# Patient Record
Sex: MALE | Race: WHITE | NOT HISPANIC OR LATINO | Employment: OTHER | URBAN - METROPOLITAN AREA
[De-identification: names, ages, dates, MRNs, and addresses within clinical notes are randomized per-mention and may not be internally consistent; named-entity substitution may affect disease eponyms.]

---

## 2023-11-16 ENCOUNTER — LAB (OUTPATIENT)
Dept: LAB | Facility: LAB | Age: 53
End: 2023-11-16
Payer: COMMERCIAL

## 2023-11-16 DIAGNOSIS — E11.9 TYPE 2 DIABETES MELLITUS WITHOUT COMPLICATIONS (MULTI): Primary | ICD-10-CM

## 2023-11-16 DIAGNOSIS — Z00.00 ENCOUNTER FOR GENERAL ADULT MEDICAL EXAMINATION WITHOUT ABNORMAL FINDINGS: ICD-10-CM

## 2023-11-16 DIAGNOSIS — R74.8 ABNORMAL LEVELS OF OTHER SERUM ENZYMES: ICD-10-CM

## 2023-11-16 DIAGNOSIS — I10 ESSENTIAL (PRIMARY) HYPERTENSION: ICD-10-CM

## 2023-11-16 DIAGNOSIS — E66.9 OBESITY, UNSPECIFIED: ICD-10-CM

## 2023-11-16 LAB
ALBUMIN SERPL-MCNC: 4.5 G/DL (ref 3.5–5)
ALP BLD-CCNC: 69 U/L (ref 35–125)
ALT SERPL-CCNC: 60 U/L (ref 5–40)
ANION GAP SERPL CALC-SCNC: 14 MMOL/L
AST SERPL-CCNC: 42 U/L (ref 5–40)
BASOPHILS # BLD AUTO: 0.04 X10*3/UL (ref 0–0.1)
BASOPHILS NFR BLD AUTO: 0.8 %
BILIRUB SERPL-MCNC: 1 MG/DL (ref 0.1–1.2)
BUN SERPL-MCNC: 12 MG/DL (ref 8–25)
CALCIUM SERPL-MCNC: 9.6 MG/DL (ref 8.5–10.4)
CHLORIDE SERPL-SCNC: 101 MMOL/L (ref 97–107)
CHOLEST SERPL-MCNC: 181 MG/DL (ref 133–200)
CHOLEST/HDLC SERPL: 3.5 {RATIO}
CO2 SERPL-SCNC: 24 MMOL/L (ref 24–31)
CREAT SERPL-MCNC: 0.9 MG/DL (ref 0.4–1.6)
CREAT UR-MCNC: 142.4 MG/DL
EOSINOPHIL # BLD AUTO: 0.13 X10*3/UL (ref 0–0.7)
EOSINOPHIL NFR BLD AUTO: 2.6 %
ERYTHROCYTE [DISTWIDTH] IN BLOOD BY AUTOMATED COUNT: 12.7 % (ref 11.5–14.5)
EST. AVERAGE GLUCOSE BLD GHB EST-MCNC: 183 MG/DL
GFR SERPL CREATININE-BSD FRML MDRD: >90 ML/MIN/1.73M*2
GLUCOSE SERPL-MCNC: 180 MG/DL (ref 65–99)
HBA1C MFR BLD: 8 %
HCT VFR BLD AUTO: 47.6 % (ref 41–52)
HDLC SERPL-MCNC: 51 MG/DL
HGB BLD-MCNC: 16.6 G/DL (ref 13.5–17.5)
IMM GRANULOCYTES # BLD AUTO: 0.01 X10*3/UL (ref 0–0.7)
IMM GRANULOCYTES NFR BLD AUTO: 0.2 % (ref 0–0.9)
LDLC SERPL CALC-MCNC: 107 MG/DL (ref 65–130)
LYMPHOCYTES # BLD AUTO: 1.64 X10*3/UL (ref 1.2–4.8)
LYMPHOCYTES NFR BLD AUTO: 32.9 %
MCH RBC QN AUTO: 31.3 PG (ref 26–34)
MCHC RBC AUTO-ENTMCNC: 34.9 G/DL (ref 32–36)
MCV RBC AUTO: 90 FL (ref 80–100)
MICROALBUMIN UR-MCNC: 45 MG/L (ref 0–23)
MICROALBUMIN/CREAT UR: 31.6 UG/MG CREAT
MONOCYTES # BLD AUTO: 0.4 X10*3/UL (ref 0.1–1)
MONOCYTES NFR BLD AUTO: 8 %
NEUTROPHILS # BLD AUTO: 2.77 X10*3/UL (ref 1.2–7.7)
NEUTROPHILS NFR BLD AUTO: 55.5 %
NRBC BLD-RTO: 0 /100 WBCS (ref 0–0)
PLATELET # BLD AUTO: 176 X10*3/UL (ref 150–450)
POTASSIUM SERPL-SCNC: 4 MMOL/L (ref 3.4–5.1)
PROT SERPL-MCNC: 7 G/DL (ref 5.9–7.9)
PSA SERPL-MCNC: 2.3 NG/ML
RBC # BLD AUTO: 5.3 X10*6/UL (ref 4.5–5.9)
SODIUM SERPL-SCNC: 139 MMOL/L (ref 133–145)
TRIGL SERPL-MCNC: 113 MG/DL (ref 40–150)
WBC # BLD AUTO: 5 X10*3/UL (ref 4.4–11.3)

## 2023-11-16 PROCEDURE — 82043 UR ALBUMIN QUANTITATIVE: CPT

## 2023-11-16 PROCEDURE — 82570 ASSAY OF URINE CREATININE: CPT

## 2023-11-16 PROCEDURE — 84153 ASSAY OF PSA TOTAL: CPT

## 2023-11-16 PROCEDURE — 85025 COMPLETE CBC W/AUTO DIFF WBC: CPT

## 2023-11-16 PROCEDURE — 80053 COMPREHEN METABOLIC PANEL: CPT

## 2023-11-16 PROCEDURE — 80061 LIPID PANEL: CPT

## 2023-11-16 PROCEDURE — 83036 HEMOGLOBIN GLYCOSYLATED A1C: CPT

## 2023-11-16 PROCEDURE — 36415 COLL VENOUS BLD VENIPUNCTURE: CPT

## 2024-02-07 DIAGNOSIS — I10 ESSENTIAL (PRIMARY) HYPERTENSION: ICD-10-CM

## 2024-02-07 RX ORDER — AMLODIPINE BESYLATE 5 MG/1
5 TABLET ORAL DAILY
Qty: 90 TABLET | Refills: 0 | Status: SHIPPED | OUTPATIENT
Start: 2024-02-07

## 2024-02-14 PROBLEM — E86.0 DEHYDRATION: Status: ACTIVE | Noted: 2024-02-14

## 2024-02-14 PROBLEM — E83.119 HEMOCHROMATOSIS: Status: ACTIVE | Noted: 2024-02-14

## 2024-02-14 PROBLEM — F41.9 ANXIETY: Status: ACTIVE | Noted: 2024-02-14

## 2024-02-14 PROBLEM — R94.5 ABNORMAL RESULTS OF LIVER FUNCTION STUDIES: Status: ACTIVE | Noted: 2024-02-14

## 2024-02-14 PROBLEM — R74.8 ELEVATED LIVER ENZYMES: Status: ACTIVE | Noted: 2022-08-09

## 2024-02-14 PROBLEM — R10.9 ABDOMINAL PAIN: Status: ACTIVE | Noted: 2024-02-14

## 2024-02-14 PROBLEM — I10 ESSENTIAL HYPERTENSION: Status: ACTIVE | Noted: 2022-07-11

## 2024-02-14 PROBLEM — K62.5 RECTAL BLEEDING: Status: ACTIVE | Noted: 2023-08-25
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
## 2024-02-14 PROBLEM — K57.92 DIVERTICULITIS: Status: ACTIVE | Noted: 2024-02-14

## 2024-02-14 PROBLEM — E11.9 DIABETES MELLITUS (MULTI): Status: ACTIVE | Noted: 2022-08-08

## 2024-02-14 PROBLEM — M25.519 SHOULDER PAIN, ACUTE: Status: ACTIVE | Noted: 2024-02-14

## 2024-02-14 PROBLEM — K57.32 DIVERTICULITIS OF LARGE INTESTINE WITHOUT BLEEDING: Status: ACTIVE | Noted: 2017-10-02

## 2024-02-14 PROBLEM — E66.9 OBESITY: Status: ACTIVE | Noted: 2022-07-11

## 2024-02-14 PROBLEM — R10.32 ABDOMINAL PAIN, LLQ: Status: ACTIVE | Noted: 2024-02-14

## 2024-02-14 PROBLEM — G47.33 OSA (OBSTRUCTIVE SLEEP APNEA): Status: ACTIVE | Noted: 2024-02-14

## 2024-02-14 PROBLEM — S49.90XA SHOULDER INJURY: Status: ACTIVE | Noted: 2024-02-14

## 2024-02-14 PROBLEM — E87.1 HYPONATREMIA: Status: ACTIVE | Noted: 2024-02-14

## 2024-02-14 PROBLEM — G47.00 INSOMNIA: Status: ACTIVE | Noted: 2024-02-14

## 2024-02-14 PROBLEM — G47.30 SLEEP APNEA: Status: ACTIVE | Noted: 2024-02-14

## 2024-02-14 PROBLEM — J45.909 ASTHMATIC BRONCHITIS (HHS-HCC): Status: ACTIVE | Noted: 2024-02-14

## 2024-02-14 PROBLEM — R73.01 IFG (IMPAIRED FASTING GLUCOSE): Status: ACTIVE | Noted: 2024-02-14

## 2024-02-15 ENCOUNTER — OFFICE VISIT (OUTPATIENT)
Dept: PRIMARY CARE | Facility: CLINIC | Age: 54
End: 2024-02-15
Payer: COMMERCIAL

## 2024-02-15 VITALS
DIASTOLIC BLOOD PRESSURE: 82 MMHG | BODY MASS INDEX: 37.38 KG/M2 | HEIGHT: 72 IN | WEIGHT: 276 LBS | HEART RATE: 78 BPM | OXYGEN SATURATION: 98 % | SYSTOLIC BLOOD PRESSURE: 140 MMHG

## 2024-02-15 DIAGNOSIS — E11.9 TYPE 2 DIABETES MELLITUS WITHOUT COMPLICATION, WITHOUT LONG-TERM CURRENT USE OF INSULIN (MULTI): ICD-10-CM

## 2024-02-15 DIAGNOSIS — I10 ESSENTIAL (PRIMARY) HYPERTENSION: ICD-10-CM

## 2024-02-15 DIAGNOSIS — Z00.00 ANNUAL PHYSICAL EXAM: Primary | ICD-10-CM

## 2024-02-15 DIAGNOSIS — E78.5 HYPERLIPIDEMIA, UNSPECIFIED HYPERLIPIDEMIA TYPE: ICD-10-CM

## 2024-02-15 LAB — POC HEMOGLOBIN A1C: 8.8 % (ref 4.2–6.5)

## 2024-02-15 PROCEDURE — 99396 PREV VISIT EST AGE 40-64: CPT | Performed by: STUDENT IN AN ORGANIZED HEALTH CARE EDUCATION/TRAINING PROGRAM

## 2024-02-15 PROCEDURE — 3077F SYST BP >= 140 MM HG: CPT | Performed by: STUDENT IN AN ORGANIZED HEALTH CARE EDUCATION/TRAINING PROGRAM

## 2024-02-15 PROCEDURE — 99213 OFFICE O/P EST LOW 20 MIN: CPT | Performed by: STUDENT IN AN ORGANIZED HEALTH CARE EDUCATION/TRAINING PROGRAM

## 2024-02-15 PROCEDURE — 3079F DIAST BP 80-89 MM HG: CPT | Performed by: STUDENT IN AN ORGANIZED HEALTH CARE EDUCATION/TRAINING PROGRAM

## 2024-02-15 PROCEDURE — 83036 HEMOGLOBIN GLYCOSYLATED A1C: CPT | Performed by: STUDENT IN AN ORGANIZED HEALTH CARE EDUCATION/TRAINING PROGRAM

## 2024-02-15 PROCEDURE — 1036F TOBACCO NON-USER: CPT | Performed by: STUDENT IN AN ORGANIZED HEALTH CARE EDUCATION/TRAINING PROGRAM

## 2024-02-15 ASSESSMENT — PAIN SCALES - GENERAL: PAINLEVEL: 0-NO PAIN

## 2024-02-15 NOTE — PROGRESS NOTES
Subjective   Patient ID: Chris Moody is a 53 y.o. male who presents for Annual Exam  He is  without any significant medical concerns or complaints.    INTERVAL HX/CURRENT CONCERNS:  Has not had a drink since ERIC, plans to continue to remain sober    CHRONIC CONDITIONS:  T2DM  HTN  Diverticulitis/Diverticulosis- follows with  Gastroenterologist, has colonoscopy every 5 years, last done around 2021    Works in construction, very active job.  Not exercising  , lives with wife    Family Hx: HTN- mother    T2DM:  Last A1c - 8.0, 7.7, 7.5 (POC), 7.3, 7.6  Current treatment:  No meds currently  - -Only used CGM temporarily for a few weeks - is willing to start again  Weight 285--> 287 --> 280 --> 286 --> 287.6 --> 276  Last eGFR - >90, 102, 91, 103  Last Creatinine - 0.90, 0.9, 1.0, 0.9  Last microalbumin -  Last eye exam - 12/2022 at [facility]  Last foot exam - today  PCV/pneumovax? - declined      HTN  Meds:  amlodipine 5 mg  BP today: 140/82  Not monitoring at home     Elevated transaminases  AST 42, 33, 50, 94/ALT 60, 58, 68, 102  Talking milk thistle  Stopped EtOH    Health Maintenance  Immunizations:     Tdap - 2020    Pneumococcal - declines    Shingles - discussed    COVID - not plans for updated vaccine     Influenza - declines  Colonoscopy: 4/2021 through [facility] Dr. Ciarra Diez, per pt 5 yr follow   Lung cancer screening:    Male Specific   PSA - 2.30   AAA screen:    Review of Systems   Constitutional:  Negative for chills and fever.   HENT:  Negative for trouble swallowing.    Eyes:  Negative for visual disturbance.   Respiratory:  Negative for cough, shortness of breath and wheezing.    Cardiovascular:  Negative for chest pain and palpitations.   Gastrointestinal:  Negative for abdominal pain, blood in stool, constipation and diarrhea.   Genitourinary:  Negative for difficulty urinating, penile pain and testicular pain.   Musculoskeletal:  Negative for arthralgias, back pain and  "joint swelling.   Skin:  Negative for rash.   Neurological:  Negative for dizziness, light-headedness and headaches.   Psychiatric/Behavioral:  Negative for dysphoric mood. The patient is not nervous/anxious.        Objective   Vitals:    02/15/24 1012   BP: 140/82   Pulse: 78   SpO2: 98%   Weight: 125 kg (276 lb)   Height: 1.84 m (6' 0.44\")       Physical Exam  Vitals and nursing note reviewed.   Constitutional:       Appearance: Normal appearance.   HENT:      Head: Normocephalic and atraumatic.      Right Ear: Tympanic membrane, ear canal and external ear normal.      Left Ear: Tympanic membrane, ear canal and external ear normal.      Mouth/Throat:      Mouth: Mucous membranes are moist.      Pharynx: Oropharynx is clear.   Eyes:      Extraocular Movements: Extraocular movements intact.      Conjunctiva/sclera: Conjunctivae normal.      Pupils: Pupils are equal, round, and reactive to light.   Cardiovascular:      Rate and Rhythm: Normal rate and regular rhythm.      Heart sounds: Normal heart sounds. No murmur heard.  Pulmonary:      Effort: Pulmonary effort is normal.      Breath sounds: Normal breath sounds. No wheezing, rhonchi or rales.   Abdominal:      General: Abdomen is flat.      Palpations: Abdomen is soft.      Tenderness: There is no abdominal tenderness.   Musculoskeletal:         General: Normal range of motion.      Cervical back: Normal range of motion and neck supple.   Skin:     General: Skin is warm and dry.   Neurological:      Mental Status: He is alert.   Psychiatric:         Mood and Affect: Mood normal.         Behavior: Behavior normal.       DIABETIC FOOT EXAM:  Skin changes - None  Pulses Present bilaterally to DP and PT  Nails - No fungal infections  Sensation in tact throughout with monifilament  No skin breaks between toes  No ulcers       Assessment/Plan     1. Annual physical exam  Normal exam  No acute issues   reviewed and discussed  Labs reviewed    2. Type 2 diabetes " mellitus without complication, without long-term current use of insulin (CMS/[facility])  Interval increase in A1c. He is currently not on any medication. Declines medication at this time. Is willing to start back using CGM in March when his work slows down a bit. Will refer to clinical pharmacist to set up. Healthy diet discussed.  Limit simple sugars. Goals reviewed.  Follow up 3 mos.   - Comprehensive metabolic panel; Future  - Hemoglobin A1c; Future  - POCT glycosylated hemoglobin (Hb A1C) manually resulted    3. Essential (primary) hypertension  Elevated today. Declines medication titration at this time, sked him to check home BP 2-3x weekly and to call if persistently >140/90.     4. Hyperlipidemia, unspecified hyperlipidemia type  Lipid panel reviewed. ASCVD risk score reviewed. Continues to decline statin.Low fat diet. Regular exercise.

## 2024-02-28 ASSESSMENT — ENCOUNTER SYMPTOMS
HEADACHES: 0
DYSPHORIC MOOD: 0
TROUBLE SWALLOWING: 0
COUGH: 0
DIFFICULTY URINATING: 0
ABDOMINAL PAIN: 0
SHORTNESS OF BREATH: 0
CHILLS: 0
DIZZINESS: 0
JOINT SWELLING: 0
FEVER: 0
WHEEZING: 0
BACK PAIN: 0
PALPITATIONS: 0
LIGHT-HEADEDNESS: 0
ARTHRALGIAS: 0
DIARRHEA: 0
BLOOD IN STOOL: 0
CONSTIPATION: 0
NERVOUS/ANXIOUS: 0

## 2024-06-18 ENCOUNTER — HOSPITAL ENCOUNTER (OUTPATIENT)
Dept: RADIOLOGY | Facility: CLINIC | Age: 54
Discharge: HOME | End: 2024-06-18
Payer: COMMERCIAL

## 2024-06-18 ENCOUNTER — OFFICE VISIT (OUTPATIENT)
Dept: ORTHOPEDIC SURGERY | Facility: CLINIC | Age: 54
End: 2024-06-18
Payer: COMMERCIAL

## 2024-06-18 DIAGNOSIS — M25.561 ACUTE PAIN OF RIGHT KNEE: ICD-10-CM

## 2024-06-18 DIAGNOSIS — M17.12 PRIMARY OSTEOARTHRITIS OF LEFT KNEE: Primary | ICD-10-CM

## 2024-06-18 PROCEDURE — 99213 OFFICE O/P EST LOW 20 MIN: CPT | Performed by: STUDENT IN AN ORGANIZED HEALTH CARE EDUCATION/TRAINING PROGRAM

## 2024-06-18 PROCEDURE — 73564 X-RAY EXAM KNEE 4 OR MORE: CPT | Mod: LEFT SIDE | Performed by: RADIOLOGY

## 2024-06-18 PROCEDURE — 73564 X-RAY EXAM KNEE 4 OR MORE: CPT | Mod: LT

## 2024-06-18 PROCEDURE — 1036F TOBACCO NON-USER: CPT | Performed by: STUDENT IN AN ORGANIZED HEALTH CARE EDUCATION/TRAINING PROGRAM

## 2024-06-18 PROCEDURE — 99203 OFFICE O/P NEW LOW 30 MIN: CPT | Performed by: STUDENT IN AN ORGANIZED HEALTH CARE EDUCATION/TRAINING PROGRAM

## 2024-06-18 ASSESSMENT — PAIN - FUNCTIONAL ASSESSMENT: PAIN_FUNCTIONAL_ASSESSMENT: 0-10

## 2024-06-18 ASSESSMENT — PAIN SCALES - GENERAL: PAINLEVEL_OUTOF10: 1

## 2024-06-18 NOTE — PROGRESS NOTES
Chris Moody  is a 54 y.o. year-old  male. he  is a new patient to our office and presents with a chief complaint of Left  knee pain.  He notes he has had pain likely for a couple of years but it is worse in the last 6 months.  He reports swelling at the end of the day lateral as well as somewhat medial sided knee pain.  No injury to note.      Past Medical, Family, and Social History reviewed   Review of Systems  A complete review of systems was conducted, pertinent only to the HPI noted above.  Constitutional: None  Eyes: No additions to above history  Ears, Nose, Throat: No additions to above history  Cardiovascular: No additions to above history  Respiratory: No additions to above history  GI: No additions to above history  : No additions to above history  Skin/Neuro: No additions to above history  Endocrine/Heme/Lymph: No additions to above history  Immunologic: No additions to above history  Psychiatric: No additions to above history  Musculoskeletal: see above    GEN: Alert and Oriented x 3  Constitutional: Well appearing , in no apparent distress.  Skin: No rashes, erythema, or induration around knee    MUSCULOSKELETAL EXAM:     Left KNEE:  ROM: 0/0/130  Effusion: positive  Alignment: [neutral]      Gait: [normal]  Sensation intact bilaterally sural/saphenous/sp/dp/tibial nerve bilaterally  Motor 5/5 knee flexion/extension/foot DF/PF/EHL/FHL bilaterally  Palpable/symmetric DP and PT pulse bilaterally      PATELLAR/EXTENSOR MECHANISM:   KNEE:  Patellar Crepitus: n  Patellar Compression Pain:n  Patellar Apprehension: [no]  Extensor Mechanism: [intact]  Straight Leg Raise: good      LIGAMENTS:  ACL: Lachmans: [negative]  PCL: [stable]  Valgus at 0 degrees: [stable]  Valgus at 30 degrees: [stable]  Varus at 0 degrees: [stable]  Varus at 30 degrees: [stable]    MENISCUS EXAM:  Joint Line Tenderness:medial and lateral joint line tenderness  McMurrays: [negative]  Pain with Deep Flexion: [No]    Xrays  independently viewed and interpreted: Advanced medial compartment joint space narrowing patellofemoral DJD    The patient history, physical examination and imaging studies are consistent with knee arthritis. The treatment options including NSAIDs, activity modification, PT (including the importance of obtaining full motion), and weight loss were discussed at length today. I have also suggested a potential for IA injection with cortisone however he would like to hold on this and use as an option in the future if the symptoms worsen.. I have discussed the potential need for arthroplasty in the future. The patient acknowledged the current plan.

## 2024-06-27 ENCOUNTER — APPOINTMENT (OUTPATIENT)
Dept: PRIMARY CARE | Facility: CLINIC | Age: 54
End: 2024-06-27
Payer: COMMERCIAL

## 2024-07-01 ENCOUNTER — APPOINTMENT (OUTPATIENT)
Dept: PRIMARY CARE | Facility: CLINIC | Age: 54
End: 2024-07-01
Payer: COMMERCIAL

## 2024-07-01 VITALS
HEART RATE: 76 BPM | DIASTOLIC BLOOD PRESSURE: 94 MMHG | OXYGEN SATURATION: 97 % | WEIGHT: 278 LBS | TEMPERATURE: 98 F | SYSTOLIC BLOOD PRESSURE: 162 MMHG | BODY MASS INDEX: 37.25 KG/M2

## 2024-07-01 DIAGNOSIS — I10 ESSENTIAL (PRIMARY) HYPERTENSION: ICD-10-CM

## 2024-07-01 DIAGNOSIS — E11.9 TYPE 2 DIABETES MELLITUS WITHOUT COMPLICATION, WITHOUT LONG-TERM CURRENT USE OF INSULIN (MULTI): Primary | ICD-10-CM

## 2024-07-01 LAB — POC HEMOGLOBIN A1C: 8 % (ref 4.2–6.5)

## 2024-07-01 PROCEDURE — 83036 HEMOGLOBIN GLYCOSYLATED A1C: CPT | Performed by: STUDENT IN AN ORGANIZED HEALTH CARE EDUCATION/TRAINING PROGRAM

## 2024-07-01 PROCEDURE — 3077F SYST BP >= 140 MM HG: CPT | Performed by: STUDENT IN AN ORGANIZED HEALTH CARE EDUCATION/TRAINING PROGRAM

## 2024-07-01 PROCEDURE — 3080F DIAST BP >= 90 MM HG: CPT | Performed by: STUDENT IN AN ORGANIZED HEALTH CARE EDUCATION/TRAINING PROGRAM

## 2024-07-01 PROCEDURE — 1036F TOBACCO NON-USER: CPT | Performed by: STUDENT IN AN ORGANIZED HEALTH CARE EDUCATION/TRAINING PROGRAM

## 2024-07-01 PROCEDURE — 99214 OFFICE O/P EST MOD 30 MIN: CPT | Performed by: STUDENT IN AN ORGANIZED HEALTH CARE EDUCATION/TRAINING PROGRAM

## 2024-07-01 RX ORDER — AMLODIPINE BESYLATE 10 MG/1
5 TABLET ORAL DAILY
Qty: 90 TABLET | Refills: 1 | Status: SHIPPED | OUTPATIENT
Start: 2024-07-01

## 2024-07-01 ASSESSMENT — PATIENT HEALTH QUESTIONNAIRE - PHQ9
2. FEELING DOWN, DEPRESSED OR HOPELESS: NOT AT ALL
1. LITTLE INTEREST OR PLEASURE IN DOING THINGS: NOT AT ALL
SUM OF ALL RESPONSES TO PHQ9 QUESTIONS 1 AND 2: 0

## 2024-07-01 ASSESSMENT — PAIN SCALES - GENERAL: PAINLEVEL: 0-NO PAIN

## 2024-07-01 NOTE — PATIENT INSTRUCTIONS
Increase amlodipine to 10 mg daily. New prescription sent to pharmacy  Return 4 weeks for BP check.

## 2024-07-01 NOTE — PROGRESS NOTES
Subjective   Chris Moody is a 54 y.o. male who presents for Diabetes Follow up. He was last seen 2/15/24 for CPE.     No pre-appt labs done.      T2DM:  Last A1c - 8.0 (POC), 8.8, 8.0, 7.7, 7.5 (POC), 7.3, 7.6  Current treatment:  No meds currently - self discontinued due to perceived side effects  --Only used CGM temporarily for a few weeks - is not willing to start again  Weight 285--> 287 --> 280 --> 286 --> 287.6 --> 276 --> 278  Last eGFR - >90, 102, 91, 103  Last Creatinine - 0.90, 0.9, 1.0, 0.9  Last microalbumin -   Last eye exam - 12/2022 at [facility] - instructed to schedule  Last foot exam -   PCV/pneumovax? - declined   He is not following diabetic diet.   Rarely checking blood sugars     HTN  Meds:  amlodipine 5 mg  BP today: 162/94  Not monitoring at home       LAB REVIEW   POC HEMOGLOBIN A1c (%)   Date Value   02/15/2024 8.8 (A)     Hemoglobin A1C (%)   Date Value   11/16/2023 8.0 (H)   05/24/2023 7.7 (H)   11/02/2022 7.3 (H)   07/11/2022 7.6 (H)     Glucose   Date Value   11/16/2023 180 mg/dL (H)   05/24/2023 201 MG/DL (H)   11/02/2022 194 MG/DL (H)   07/11/2022 151 MG/DL (H)     Creatinine   Date Value   11/16/2023 0.90 mg/dL   05/24/2023 0.9 MG/DL   11/02/2022 1.0 MG/DL   07/11/2022 0.9 MG/DL     eGFR (mL/min/1.73m*2)   Date Value   11/16/2023 >90     ESTIMATED GFR (mL/min/1.73 m2)   Date Value   05/24/2023 102   11/02/2022 91   07/11/2022 103     Lab Results   Component Value Date    ALBUR 59 (H) 05/24/2023    CREATININE 0.90 11/16/2023     Lab Results   Component Value Date    CHOL 181 11/16/2023    CHOL 172 07/11/2022    CHOL 196 04/19/2021     Lab Results   Component Value Date    HDL 51.0 11/16/2023    HDL 51 07/11/2022    HDL 45 04/19/2021     Lab Results   Component Value Date    LDLCALC 107 11/16/2023    LDLCALC 104 07/11/2022    LDLCALC 129 04/19/2021     Lab Results   Component Value Date    TRIG 113 11/16/2023    TRIG 86 07/11/2022    TRIG 109 04/19/2021       ROS  otherwise negative aside from what was mentioned above in HPI.      Objective   BP (!) 162/94 (BP Location: Left arm)   Pulse 76   Temp 36.7 °C (98 °F) (Temporal)   Wt 126 kg (278 lb)   SpO2 97%   BMI 37.25 kg/m²     Physical Exam  Constitutional:       Appearance: Normal appearance.   Cardiovascular:      Rate and Rhythm: Normal rate and regular rhythm.      Heart sounds: Normal heart sounds. No murmur heard.  Pulmonary:      Effort: Pulmonary effort is normal.      Breath sounds: Normal breath sounds. No wheezing, rhonchi or rales.   Musculoskeletal:      Right lower leg: No edema.      Left lower leg: No edema.   Neurological:      Mental Status: He is alert.         Assessment/Plan   1. Type 2 diabetes mellitus without complication, without long-term current use of insulin (Multi)  Needs better control. Expresses lack of interest in getting his diabetes under better control. Does not trust [employer] so does not necessarily want to be on medication. Discussed increasing his dietary efforts, carb counting, CGM. He declines - is not willing to make diet change at this time. Discussed risks of untreated/uncontrolled diabetes at length. He is agreeable to starting another medication if affordable. Not willing to take injection. Will look into farxiga.   - POCT glycosylated hemoglobin (Hb A1C) manually resulted    2. Essential (primary) hypertension  Needs better control. Increase amlodipine to 10mg. Follow up 4 weeks for repeat HTN check.   - amLODIPine (Norvasc) 10 mg tablet; Take 0.5 tablets (5 mg) by mouth once daily.  Dispense: 90 tablet; Refill: 1

## 2024-07-18 DIAGNOSIS — R03.0 ELEVATED BLOOD PRESSURE READING WITHOUT DIAGNOSIS OF HYPERTENSION: ICD-10-CM

## 2024-07-18 DIAGNOSIS — E11.9 TYPE 2 DIABETES MELLITUS WITHOUT COMPLICATION, WITHOUT LONG-TERM CURRENT USE OF INSULIN (MULTI): Primary | ICD-10-CM

## 2024-07-18 RX ORDER — AMLODIPINE BESYLATE 10 MG/1
10 TABLET ORAL DAILY
Qty: 90 TABLET | Refills: 1 | Status: SHIPPED | OUTPATIENT
Start: 2024-07-18 | End: 2025-01-14

## 2024-07-18 RX ORDER — DAPAGLIFLOZIN 10 MG/1
10 TABLET, FILM COATED ORAL DAILY
Qty: 90 TABLET | Refills: 1 | Status: SHIPPED | OUTPATIENT
Start: 2024-07-18

## 2024-08-01 ENCOUNTER — APPOINTMENT (OUTPATIENT)
Dept: PRIMARY CARE | Facility: CLINIC | Age: 54
End: 2024-08-01
Payer: COMMERCIAL

## 2024-08-22 ENCOUNTER — TELEPHONE (OUTPATIENT)
Dept: PRIMARY CARE | Facility: CLINIC | Age: 54
End: 2024-08-22

## 2024-08-22 ENCOUNTER — APPOINTMENT (OUTPATIENT)
Dept: PRIMARY CARE | Facility: CLINIC | Age: 54
End: 2024-08-22
Payer: COMMERCIAL

## 2024-08-22 VITALS
OXYGEN SATURATION: 99 % | HEART RATE: 79 BPM | BODY MASS INDEX: 36.98 KG/M2 | SYSTOLIC BLOOD PRESSURE: 124 MMHG | WEIGHT: 276 LBS | DIASTOLIC BLOOD PRESSURE: 82 MMHG

## 2024-08-22 DIAGNOSIS — I10 ESSENTIAL (PRIMARY) HYPERTENSION: Primary | ICD-10-CM

## 2024-08-22 DIAGNOSIS — E11.9 TYPE 2 DIABETES MELLITUS WITHOUT COMPLICATION, WITHOUT LONG-TERM CURRENT USE OF INSULIN (MULTI): ICD-10-CM

## 2024-08-22 DIAGNOSIS — E13.69 OTHER SPECIFIED DIABETES MELLITUS WITH OTHER SPECIFIED COMPLICATION, UNSPECIFIED WHETHER LONG TERM INSULIN USE (MULTI): ICD-10-CM

## 2024-08-22 PROCEDURE — 3079F DIAST BP 80-89 MM HG: CPT | Performed by: STUDENT IN AN ORGANIZED HEALTH CARE EDUCATION/TRAINING PROGRAM

## 2024-08-22 PROCEDURE — 99213 OFFICE O/P EST LOW 20 MIN: CPT | Performed by: STUDENT IN AN ORGANIZED HEALTH CARE EDUCATION/TRAINING PROGRAM

## 2024-08-22 PROCEDURE — 3074F SYST BP LT 130 MM HG: CPT | Performed by: STUDENT IN AN ORGANIZED HEALTH CARE EDUCATION/TRAINING PROGRAM

## 2024-08-22 ASSESSMENT — PAIN SCALES - GENERAL: PAINLEVEL: 0-NO PAIN

## 2024-08-22 ASSESSMENT — PATIENT HEALTH QUESTIONNAIRE - PHQ9
SUM OF ALL RESPONSES TO PHQ9 QUESTIONS 1 AND 2: 0
1. LITTLE INTEREST OR PLEASURE IN DOING THINGS: NOT AT ALL
2. FEELING DOWN, DEPRESSED OR HOPELESS: NOT AT ALL

## 2024-08-22 NOTE — PROGRESS NOTES
GENERAL PROGRESS NOTE    Chief Complaint   Patient presents with    Hypertension       HPI  Chris Moody is a 54 y.o. male that presents today for HTN check.     #HTN  Meds:  amlodipine 10 mg  BP today: 124/82  Home readings: not checking    #T2DM  Has been taking Farxiga for past month. Tolerating well, no side effects   Not checking blood sugars.  Eating alright but not following a diabetic diet, tries to generally make healthy choices    Vital signs   /82 (BP Location: Left arm)   Pulse 79   Wt 125 kg (276 lb)   SpO2 99%   BMI 36.98 kg/m²      Current Outpatient Medications   Medication Sig Dispense Refill    amLODIPine (Norvasc) 10 mg tablet Take 1 tablet (10 mg) by mouth once daily. 90 tablet 1    dapagliflozin propanediol (Farxiga) 10 mg Take 1 tablet (10 mg) by mouth once daily. Hold if not eating or drinking normally. 90 tablet 1     No current facility-administered medications for this visit.       Review of Systems   Constitutional:  Negative for chills and fever.   Eyes:  Negative for visual disturbance.   Respiratory:  Negative for shortness of breath.    Cardiovascular:  Negative for chest pain, palpitations and leg swelling.   Endocrine: Negative for polydipsia and polyuria.   Neurological:  Negative for headaches.        Physical Exam  Constitutional:       Appearance: Normal appearance.   Cardiovascular:      Rate and Rhythm: Normal rate and regular rhythm.      Heart sounds: Normal heart sounds. No murmur heard.  Pulmonary:      Effort: Pulmonary effort is normal.      Breath sounds: Normal breath sounds. No wheezing, rhonchi or rales.   Musculoskeletal:      Right lower leg: No edema.      Left lower leg: No edema.   Neurological:      Mental Status: He is alert.         Assessment/Plan   1. Essential (primary) hypertension  Improved control with increase in  amlodipine dosing. Continue amlodipine 10 mg. Discussed DASH/low salt diet. Encouraged to increase  exercise.     2. Type 2 diabetes mellitus without complication, without long-term current use of insulin (Multi)  Continue Farxiga. Discussed diet and exercise recommendations. Follow up 3 mos DM check         Julissa Purdy MD  08/22/24  4:49 PM

## 2024-08-29 ASSESSMENT — ENCOUNTER SYMPTOMS
POLYDIPSIA: 0
FEVER: 0
HEADACHES: 0
CHILLS: 0
SHORTNESS OF BREATH: 0
PALPITATIONS: 0

## 2024-11-04 ENCOUNTER — TELEPHONE (OUTPATIENT)
Dept: PRIMARY CARE | Facility: CLINIC | Age: 54
End: 2024-11-04

## 2024-11-04 ENCOUNTER — APPOINTMENT (OUTPATIENT)
Dept: PRIMARY CARE | Facility: CLINIC | Age: 54
End: 2024-11-04
Payer: COMMERCIAL

## 2024-11-04 VITALS
DIASTOLIC BLOOD PRESSURE: 78 MMHG | BODY MASS INDEX: 37.25 KG/M2 | WEIGHT: 275 LBS | OXYGEN SATURATION: 97 % | HEART RATE: 81 BPM | HEIGHT: 72 IN | SYSTOLIC BLOOD PRESSURE: 136 MMHG

## 2024-11-04 DIAGNOSIS — E78.5 HYPERLIPIDEMIA, UNSPECIFIED HYPERLIPIDEMIA TYPE: ICD-10-CM

## 2024-11-04 DIAGNOSIS — E11.9 TYPE 2 DIABETES MELLITUS WITHOUT COMPLICATION, WITHOUT LONG-TERM CURRENT USE OF INSULIN (MULTI): Primary | ICD-10-CM

## 2024-11-04 DIAGNOSIS — I10 ESSENTIAL (PRIMARY) HYPERTENSION: ICD-10-CM

## 2024-11-04 DIAGNOSIS — I10 ESSENTIAL HYPERTENSION: ICD-10-CM

## 2024-11-04 DIAGNOSIS — Z12.5 PROSTATE CANCER SCREENING: ICD-10-CM

## 2024-11-04 DIAGNOSIS — Z00.00 ANNUAL PHYSICAL EXAM: ICD-10-CM

## 2024-11-04 LAB — POC HEMOGLOBIN A1C: 7.1 % (ref 4.2–6.5)

## 2024-11-04 PROCEDURE — 3075F SYST BP GE 130 - 139MM HG: CPT | Performed by: STUDENT IN AN ORGANIZED HEALTH CARE EDUCATION/TRAINING PROGRAM

## 2024-11-04 PROCEDURE — 99214 OFFICE O/P EST MOD 30 MIN: CPT | Performed by: STUDENT IN AN ORGANIZED HEALTH CARE EDUCATION/TRAINING PROGRAM

## 2024-11-04 PROCEDURE — 3008F BODY MASS INDEX DOCD: CPT | Performed by: STUDENT IN AN ORGANIZED HEALTH CARE EDUCATION/TRAINING PROGRAM

## 2024-11-04 PROCEDURE — 3078F DIAST BP <80 MM HG: CPT | Performed by: STUDENT IN AN ORGANIZED HEALTH CARE EDUCATION/TRAINING PROGRAM

## 2024-11-04 PROCEDURE — 1036F TOBACCO NON-USER: CPT | Performed by: STUDENT IN AN ORGANIZED HEALTH CARE EDUCATION/TRAINING PROGRAM

## 2024-11-04 PROCEDURE — 83036 HEMOGLOBIN GLYCOSYLATED A1C: CPT | Performed by: STUDENT IN AN ORGANIZED HEALTH CARE EDUCATION/TRAINING PROGRAM

## 2024-11-04 NOTE — PROGRESS NOTES
Subjective   Chris Moody is a 54 y.o. male who presents for Diabetes Follow up    Did not have pre-appt labs done - has not had labs done since 11/2023    HPI   Current medication regimen:   Farxiga 10 mg   Tolerating current medication regimen yes  Prior metformin and rybelsus -- discontinued due to side effects  Blood sugars at home averaging: not checking  CGM? no  Hypoglycemic episodes no  Last A1C : 7.1 POC today, 8.0   Last eGFR - await labs  Last Creatinine - await labs  Last microalbumin - await labs  Up to date on yearly eye exams : Due  -Last eye exam - appt scheduled 11/15/24 - [facility] - Dr. Alfonso  Follows with podiatry no  Not following diabetic diet  SECONDARY PREVENTION  - Statin? No  - ACE-I/ARB? No  - Aspirin? No    #HTN  Continues on amlodipine 10 mg   BP today 136/78  Home blood pressures - not checking    LAB REVIEW   POC HEMOGLOBIN A1c (%)   Date Value   07/01/2024 8.0 (A)   02/15/2024 8.8 (A)     Hemoglobin A1C (%)   Date Value   11/16/2023 8.0 (H)   05/24/2023 7.7 (H)   11/02/2022 7.3 (H)   07/11/2022 7.6 (H)     Glucose   Date Value   11/16/2023 180 mg/dL (H)   05/24/2023 201 MG/DL (H)   11/02/2022 194 MG/DL (H)   07/11/2022 151 MG/DL (H)     Creatinine   Date Value   11/16/2023 0.90 mg/dL   05/24/2023 0.9 MG/DL   11/02/2022 1.0 MG/DL   07/11/2022 0.9 MG/DL     eGFR (mL/min/1.73m*2)   Date Value   11/16/2023 >90     ESTIMATED GFR (mL/min/1.73 m2)   Date Value   05/24/2023 102   11/02/2022 91   07/11/2022 103     Lab Results   Component Value Date    ALBUR 59 (H) 05/24/2023    CREATININE 0.90 11/16/2023     Lab Results   Component Value Date    CHOL 181 11/16/2023    CHOL 172 07/11/2022    CHOL 196 04/19/2021     Lab Results   Component Value Date    HDL 51.0 11/16/2023    HDL 51 07/11/2022    HDL 45 04/19/2021     Lab Results   Component Value Date    LDLCALC 107 11/16/2023    LDLCALC 104 07/11/2022    LDLCALC 129 04/19/2021     Lab Results   Component Value Date     TRIG 113 11/16/2023    TRIG 86 07/11/2022    TRIG 109 04/19/2021       ROS otherwise negative aside from what was mentioned above in HPI.      Objective   /78   Pulse 81   Ht 1.829 m (6')   Wt 125 kg (275 lb)   SpO2 97%   BMI 37.30 kg/m²     Physical Exam  Constitutional:       Appearance: Normal appearance.   Cardiovascular:      Rate and Rhythm: Normal rate and regular rhythm.      Heart sounds: Normal heart sounds. No murmur heard.  Pulmonary:      Effort: Pulmonary effort is normal.      Breath sounds: Normal breath sounds. No wheezing, rhonchi or rales.   Musculoskeletal:      Right lower leg: No edema.      Left lower leg: No edema.   Neurological:      Mental Status: He is alert.         Assessment/Plan   1. Type 2 diabetes mellitus without complication, without long-term current use of insulin (Multi) (Primary)  Improved control  Continue farxiga 10 mg. Tolerating well without side effects.  Reminded again needs to have labs drawn  Routine diabetic retinopathy screening scheduled  Continue dietary efforts and physical activity  - POCT glycosylated hemoglobin (Hb A1C) manually resulted  - Collection capillary blood specimen; Future    2. Essential hypertension  Continue amlodipine. Tolerating well.   Reminded to have labs drawn  Discussed dietary efforts and physical activity. Recommend regular physical activity, DASH diet.   Repeat in 6mos

## 2024-11-06 ENCOUNTER — APPOINTMENT (OUTPATIENT)
Dept: PRIMARY CARE | Facility: CLINIC | Age: 54
End: 2024-11-06
Payer: COMMERCIAL

## 2024-11-12 PROBLEM — S49.90XA SHOULDER INJURY: Status: RESOLVED | Noted: 2024-02-14 | Resolved: 2024-11-12

## 2024-11-12 PROBLEM — E86.0 DEHYDRATION: Status: RESOLVED | Noted: 2024-02-14 | Resolved: 2024-11-12

## 2024-11-12 PROBLEM — R10.9 ABDOMINAL PAIN: Status: RESOLVED | Noted: 2024-02-14 | Resolved: 2024-11-12

## 2024-11-12 PROBLEM — M25.519 SHOULDER PAIN, ACUTE: Status: RESOLVED | Noted: 2024-02-14 | Resolved: 2024-11-12

## 2024-11-12 PROBLEM — K62.5 RECTAL BLEEDING: Status: RESOLVED | Noted: 2023-08-25 | Resolved: 2024-11-12
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
## 2024-11-12 PROBLEM — R10.32 ABDOMINAL PAIN, LLQ: Status: RESOLVED | Noted: 2024-02-14 | Resolved: 2024-11-12

## 2024-11-12 PROBLEM — K57.92 DIVERTICULITIS: Status: RESOLVED | Noted: 2024-02-14 | Resolved: 2024-11-12

## 2024-12-24 ENCOUNTER — TELEPHONE (OUTPATIENT)
Dept: PRIMARY CARE | Facility: CLINIC | Age: 54
End: 2024-12-24
Payer: COMMERCIAL

## 2024-12-24 DIAGNOSIS — E11.9 TYPE 2 DIABETES MELLITUS WITHOUT COMPLICATION, WITHOUT LONG-TERM CURRENT USE OF INSULIN (MULTI): ICD-10-CM

## 2024-12-24 RX ORDER — DAPAGLIFLOZIN 10 MG/1
10 TABLET, FILM COATED ORAL DAILY
Qty: 90 TABLET | Refills: 1 | Status: SHIPPED | OUTPATIENT
Start: 2024-12-24

## 2024-12-24 NOTE — TELEPHONE ENCOUNTER
Refill for     Dapagliflozin Propanediol 10 MG (Patient is out of Medication, would like Prescription called into local Pharmacy)    Pharmacy is [facility] in [place]    Patient can be reached at [phone], Please call once order is placed.

## 2025-01-09 ENCOUNTER — TELEPHONE (OUTPATIENT)
Dept: PRIMARY CARE | Facility: CLINIC | Age: 55
End: 2025-01-09
Payer: COMMERCIAL

## 2025-01-09 DIAGNOSIS — R03.0 ELEVATED BLOOD PRESSURE READING WITHOUT DIAGNOSIS OF HYPERTENSION: ICD-10-CM

## 2025-01-09 RX ORDER — AMLODIPINE BESYLATE 10 MG/1
10 TABLET ORAL DAILY
Qty: 90 TABLET | Refills: 2 | Status: SHIPPED | OUTPATIENT
Start: 2025-01-09 | End: 2025-07-08

## 2025-01-09 NOTE — TELEPHONE ENCOUNTER
Patient called Rx line and requested a refill for Amlodipine 10 mg. Last ov 8/22/2024.  Pharmacy: [facility] - [address]

## 2025-02-21 LAB
ALBUMIN SERPL-MCNC: 4.8 G/DL (ref 3.6–5.1)
ALP SERPL-CCNC: 62 U/L (ref 35–144)
ALT SERPL-CCNC: 29 U/L (ref 9–46)
ANION GAP SERPL CALCULATED.4IONS-SCNC: 11 MMOL/L (CALC) (ref 7–17)
AST SERPL-CCNC: 19 U/L (ref 10–35)
BASOPHILS # BLD AUTO: 38 CELLS/UL (ref 0–200)
BASOPHILS NFR BLD AUTO: 0.7 %
BILIRUB SERPL-MCNC: 0.8 MG/DL (ref 0.2–1.2)
BUN SERPL-MCNC: 15 MG/DL (ref 7–25)
CALCIUM SERPL-MCNC: 9.6 MG/DL (ref 8.6–10.3)
CHLORIDE SERPL-SCNC: 101 MMOL/L (ref 98–110)
CHOLEST SERPL-MCNC: 194 MG/DL
CHOLEST/HDLC SERPL: 3.5 (CALC)
CO2 SERPL-SCNC: 26 MMOL/L (ref 20–32)
CREAT SERPL-MCNC: 0.92 MG/DL (ref 0.7–1.3)
EGFRCR SERPLBLD CKD-EPI 2021: 99 ML/MIN/1.73M2
EOSINOPHIL # BLD AUTO: 70 CELLS/UL (ref 15–500)
EOSINOPHIL NFR BLD AUTO: 1.3 %
ERYTHROCYTE [DISTWIDTH] IN BLOOD BY AUTOMATED COUNT: 12.8 % (ref 11–15)
EST. AVERAGE GLUCOSE BLD GHB EST-MCNC: 137 MG/DL
EST. AVERAGE GLUCOSE BLD GHB EST-SCNC: 7.6 MMOL/L
GLUCOSE SERPL-MCNC: 132 MG/DL (ref 65–99)
HBA1C MFR BLD: 6.4 % OF TOTAL HGB
HCT VFR BLD AUTO: 52.1 % (ref 38.5–50)
HDLC SERPL-MCNC: 56 MG/DL
HGB BLD-MCNC: 17.6 G/DL (ref 13.2–17.1)
LDLC SERPL CALC-MCNC: 116 MG/DL (CALC)
LYMPHOCYTES # BLD AUTO: 1809 CELLS/UL (ref 850–3900)
LYMPHOCYTES NFR BLD AUTO: 33.5 %
MCH RBC QN AUTO: 31.1 PG (ref 27–33)
MCHC RBC AUTO-ENTMCNC: 33.8 G/DL (ref 32–36)
MCV RBC AUTO: 92 FL (ref 80–100)
MONOCYTES # BLD AUTO: 400 CELLS/UL (ref 200–950)
MONOCYTES NFR BLD AUTO: 7.4 %
NEUTROPHILS # BLD AUTO: 3083 CELLS/UL (ref 1500–7800)
NEUTROPHILS NFR BLD AUTO: 57.1 %
NONHDLC SERPL-MCNC: 138 MG/DL (CALC)
PLATELET # BLD AUTO: 214 THOUSAND/UL (ref 140–400)
PMV BLD REES-ECKER: 10.2 FL (ref 7.5–12.5)
POTASSIUM SERPL-SCNC: 4.3 MMOL/L (ref 3.5–5.3)
PROT SERPL-MCNC: 7.6 G/DL (ref 6.1–8.1)
PSA SERPL-MCNC: 2.78 NG/ML
RBC # BLD AUTO: 5.66 MILLION/UL (ref 4.2–5.8)
SODIUM SERPL-SCNC: 138 MMOL/L (ref 135–146)
TRIGL SERPL-MCNC: 108 MG/DL
WBC # BLD AUTO: 5.4 THOUSAND/UL (ref 3.8–10.8)

## 2025-02-28 DIAGNOSIS — E11.9 TYPE 2 DIABETES MELLITUS WITHOUT COMPLICATION, WITHOUT LONG-TERM CURRENT USE OF INSULIN (MULTI): ICD-10-CM

## 2025-02-28 DIAGNOSIS — R03.0 ELEVATED BLOOD PRESSURE READING WITHOUT DIAGNOSIS OF HYPERTENSION: ICD-10-CM

## 2025-03-03 RX ORDER — AMLODIPINE BESYLATE 10 MG/1
10 TABLET ORAL DAILY
Qty: 90 TABLET | Refills: 0 | Status: SHIPPED | OUTPATIENT
Start: 2025-03-03

## 2025-03-03 RX ORDER — DAPAGLIFLOZIN 10 MG/1
10 TABLET, FILM COATED ORAL DAILY
Qty: 90 TABLET | Refills: 0 | Status: SHIPPED | OUTPATIENT
Start: 2025-03-03 | End: 2025-03-06 | Stop reason: SDUPTHER

## 2025-03-05 PROBLEM — E11.9 TYPE 2 DIABETES MELLITUS WITHOUT COMPLICATION (MULTI): Status: ACTIVE | Noted: 2022-08-08

## 2025-03-05 PROBLEM — R73.01 IMPAIRED FASTING GLUCOSE: Status: ACTIVE | Noted: 2025-03-05

## 2025-03-06 ENCOUNTER — APPOINTMENT (OUTPATIENT)
Dept: PRIMARY CARE | Facility: CLINIC | Age: 55
End: 2025-03-06
Payer: COMMERCIAL

## 2025-03-06 ENCOUNTER — TELEPHONE (OUTPATIENT)
Dept: PRIMARY CARE | Facility: CLINIC | Age: 55
End: 2025-03-06

## 2025-03-06 VITALS
BODY MASS INDEX: 35.35 KG/M2 | WEIGHT: 261 LBS | HEART RATE: 78 BPM | HEIGHT: 72 IN | OXYGEN SATURATION: 96 % | TEMPERATURE: 98.2 F | SYSTOLIC BLOOD PRESSURE: 152 MMHG | DIASTOLIC BLOOD PRESSURE: 88 MMHG

## 2025-03-06 DIAGNOSIS — E78.5 HYPERLIPIDEMIA, UNSPECIFIED HYPERLIPIDEMIA TYPE: ICD-10-CM

## 2025-03-06 DIAGNOSIS — I10 ESSENTIAL HYPERTENSION: ICD-10-CM

## 2025-03-06 DIAGNOSIS — E11.9 TYPE 2 DIABETES MELLITUS WITHOUT COMPLICATION, WITHOUT LONG-TERM CURRENT USE OF INSULIN: Primary | ICD-10-CM

## 2025-03-06 DIAGNOSIS — Z13.6 SCREENING FOR HEART DISEASE: ICD-10-CM

## 2025-03-06 DIAGNOSIS — I10 ESSENTIAL HYPERTENSION: Primary | ICD-10-CM

## 2025-03-06 DIAGNOSIS — R03.0 ELEVATED BLOOD PRESSURE READING WITHOUT DIAGNOSIS OF HYPERTENSION: ICD-10-CM

## 2025-03-06 PROCEDURE — 93000 ELECTROCARDIOGRAM COMPLETE: CPT | Performed by: STUDENT IN AN ORGANIZED HEALTH CARE EDUCATION/TRAINING PROGRAM

## 2025-03-06 PROCEDURE — 3008F BODY MASS INDEX DOCD: CPT | Performed by: STUDENT IN AN ORGANIZED HEALTH CARE EDUCATION/TRAINING PROGRAM

## 2025-03-06 PROCEDURE — 3077F SYST BP >= 140 MM HG: CPT | Performed by: STUDENT IN AN ORGANIZED HEALTH CARE EDUCATION/TRAINING PROGRAM

## 2025-03-06 PROCEDURE — 99214 OFFICE O/P EST MOD 30 MIN: CPT | Performed by: STUDENT IN AN ORGANIZED HEALTH CARE EDUCATION/TRAINING PROGRAM

## 2025-03-06 PROCEDURE — 3079F DIAST BP 80-89 MM HG: CPT | Performed by: STUDENT IN AN ORGANIZED HEALTH CARE EDUCATION/TRAINING PROGRAM

## 2025-03-06 PROCEDURE — 4010F ACE/ARB THERAPY RXD/TAKEN: CPT | Performed by: STUDENT IN AN ORGANIZED HEALTH CARE EDUCATION/TRAINING PROGRAM

## 2025-03-06 RX ORDER — DAPAGLIFLOZIN 10 MG/1
10 TABLET, FILM COATED ORAL DAILY
Qty: 90 TABLET | Refills: 2 | Status: SHIPPED | OUTPATIENT
Start: 2025-03-06

## 2025-03-06 RX ORDER — AMLODIPINE BESYLATE 10 MG/1
10 TABLET ORAL DAILY
Qty: 90 TABLET | Refills: 0 | Status: CANCELLED | OUTPATIENT
Start: 2025-03-06

## 2025-03-06 RX ORDER — OLMESARTAN MEDOXOMIL 20 MG/1
20 TABLET ORAL DAILY
Qty: 90 TABLET | Refills: 1 | Status: SHIPPED | OUTPATIENT
Start: 2025-03-06 | End: 2025-09-02

## 2025-03-06 ASSESSMENT — ENCOUNTER SYMPTOMS
DEPRESSION: 0
OCCASIONAL FEELINGS OF UNSTEADINESS: 0
LOSS OF SENSATION IN FEET: 0

## 2025-03-06 ASSESSMENT — PAIN SCALES - GENERAL: PAINLEVEL_OUTOF10: 0-NO PAIN

## 2025-03-06 ASSESSMENT — PATIENT HEALTH QUESTIONNAIRE - PHQ9
1. LITTLE INTEREST OR PLEASURE IN DOING THINGS: NOT AT ALL
2. FEELING DOWN, DEPRESSED OR HOPELESS: NOT AT ALL
SUM OF ALL RESPONSES TO PHQ9 QUESTIONS 1 AND 2: 0

## 2025-03-06 NOTE — PROGRESS NOTES
Subjective   Patient ID: Chris Moody is a 54 y.o. male who presents for Annual Exam (Labs completed- 02/20/2025/EKG-today/Colonoscopy-2021- 5yrs).    Chris Moody is here for his annual physical examination without any significant medical concerns or complaints.    INTERVAL HX/CURRENT CONCERNS:  Now seeing orthopedics for Lt knee OA. Will be having knee replacement when he is ready.     Has lost about 13 lbs since last visit.     CHRONIC CONDITIONS:  T2DM   HTN - amlodipine 10 mg    Current medication regimen:   Farxiga 10 mg   Tolerating current medication regimen yes  Prior metformin and rybelsus -- discontinued due to side effects  Blood sugars at home averaging: not checking - does have glucometer  CGM? no  Hypoglycemic episodes no  Last A1C : ***, 7.1 POC today, 8.0   Last eGFR - await labs  Last Creatinine - await labs  Last microalbumin - await labs  Up to date on yearly eye exams : Due  -Last eye exam - ***  Follows with podiatry no  Not following diabetic diet  SECONDARY PREVENTION  - Statin? No  - ACE-I/ARB? No  - Aspirin? No       Health Maintenance  Immunizations:     Tdap 2020    Pneumococcal - declines    Shingles - discussed, considering    COVID - not considering updated vaccine    Influenza - does not get    Colonoscopy: 2021 [facility]   Lung cancer screening:    Male Specific   PSA last done   AAA screen:    Review of Systems      Objective   Vitals:    03/06/25 1604   BP: 152/88   BP Location: Left arm   Patient Position: Sitting   BP Cuff Size: Large adult   Pulse: 78   Temp: 36.8 °C (98.2 °F)   TempSrc: Temporal   SpO2: 96%   Weight: 118 kg (261 lb)   Height: 1.829 m (6')       Physical Exam      {Assess/Plan SmartLinks (Optional) - DX aware test:78980}  Add olmesartan   118 kg (261 lb)   Height: 1.829 m (6')       Physical Exam  Constitutional:       Appearance: Normal appearance.   Cardiovascular:      Rate and Rhythm: Normal rate and regular rhythm.      Heart sounds: Normal heart sounds. No murmur heard.  Pulmonary:      Effort: Pulmonary effort is normal.      Breath sounds: Normal breath sounds. No wheezing, rhonchi or rales.   Musculoskeletal:      Right lower leg: No edema.      Left lower leg: No edema.   Neurological:      Mental Status: He is alert.         ASSESSMENT AND PLAN   1. Type 2 diabetes mellitus without complication, without long-term current use of insulin (Multi) (Primary)  Controlled  Continue current medication regimen.   Not on statin - declines.  Routine diabetic retinopathy screening up to date   Discussed dietary efforts and physical activity  - dapagliflozin propanediol (Farxiga) 10 mg; Take 1 tablet (10 mg) by mouth once daily.  Dispense: 90 tablet; Refill: 2    2. Essential hypertension  Needs better control. Add olmesartan. Continue amlodipine.   Labs reviewed and discussed. Stable renal function  Discussed dietary efforts and physical activity  Follow up 3 mos  - olmesartan (BENIcar) 20 mg tablet; Take 1 tablet (20 mg) by mouth once daily.  Dispense: 90 tablet; Refill: 1    3. Hyperlipidemia, unspecified hyperlipidemia type  Lipid panel reviewed. Discussed statin recommended in all diabetic patients for primary prevention of cardiovascular disease and mortality. Continues to decline statin.  Recommend lifestyle interventions including healthy diet  that includes lean proteins, vegetables, fruits, and whole grains.  Limit saturated fats, excess sodium, and processed foods.  Limit sugary drinks and sweets.  Moderate exercise at least 30 minutes per day, 5 days per week.      4. Screening for heart disease  - ECG 12 Lead

## 2025-03-25 ASSESSMENT — ENCOUNTER SYMPTOMS
CHILLS: 0
FEVER: 0
HEADACHES: 0
PALPITATIONS: 0
SHORTNESS OF BREATH: 0

## 2025-05-13 DIAGNOSIS — I10 ESSENTIAL HYPERTENSION: ICD-10-CM

## 2025-06-13 ENCOUNTER — APPOINTMENT (OUTPATIENT)
Dept: PRIMARY CARE | Facility: CLINIC | Age: 55
End: 2025-06-13
Payer: COMMERCIAL

## 2025-06-13 ENCOUNTER — TELEPHONE (OUTPATIENT)
Dept: PRIMARY CARE | Facility: CLINIC | Age: 55
End: 2025-06-13

## 2025-06-13 VITALS
BODY MASS INDEX: 35.76 KG/M2 | OXYGEN SATURATION: 96 % | WEIGHT: 264 LBS | DIASTOLIC BLOOD PRESSURE: 74 MMHG | SYSTOLIC BLOOD PRESSURE: 124 MMHG | HEIGHT: 72 IN | HEART RATE: 92 BPM

## 2025-06-13 DIAGNOSIS — R03.0 ELEVATED BLOOD PRESSURE READING WITHOUT DIAGNOSIS OF HYPERTENSION: ICD-10-CM

## 2025-06-13 DIAGNOSIS — E11.9 TYPE 2 DIABETES MELLITUS WITHOUT COMPLICATION, WITHOUT LONG-TERM CURRENT USE OF INSULIN: ICD-10-CM

## 2025-06-13 DIAGNOSIS — I10 ESSENTIAL HYPERTENSION: ICD-10-CM

## 2025-06-13 DIAGNOSIS — I10 ESSENTIAL HYPERTENSION: Primary | ICD-10-CM

## 2025-06-13 DIAGNOSIS — E78.5 HYPERLIPIDEMIA, UNSPECIFIED HYPERLIPIDEMIA TYPE: ICD-10-CM

## 2025-06-13 LAB
POC APPEARANCE, URINE: CLEAR
POC BILIRUBIN, URINE: NEGATIVE
POC BLOOD, URINE: NEGATIVE
POC COLOR, URINE: YELLOW
POC GLUCOSE, URINE: ABNORMAL MG/DL
POC HEMOGLOBIN A1C: 6 % (ref 4.2–6.5)
POC KETONES, URINE: NEGATIVE MG/DL
POC LEUKOCYTES, URINE: NEGATIVE
POC NITRITE,URINE: NEGATIVE
POC PH, URINE: 7 PH
POC PROTEIN, URINE: NEGATIVE MG/DL
POC SPECIFIC GRAVITY, URINE: 1.01
POC UROBILINOGEN, URINE: 0.2 EU/DL

## 2025-06-13 PROCEDURE — 3044F HG A1C LEVEL LT 7.0%: CPT | Performed by: STUDENT IN AN ORGANIZED HEALTH CARE EDUCATION/TRAINING PROGRAM

## 2025-06-13 PROCEDURE — 3078F DIAST BP <80 MM HG: CPT | Performed by: STUDENT IN AN ORGANIZED HEALTH CARE EDUCATION/TRAINING PROGRAM

## 2025-06-13 PROCEDURE — 3074F SYST BP LT 130 MM HG: CPT | Performed by: STUDENT IN AN ORGANIZED HEALTH CARE EDUCATION/TRAINING PROGRAM

## 2025-06-13 PROCEDURE — 99214 OFFICE O/P EST MOD 30 MIN: CPT | Performed by: STUDENT IN AN ORGANIZED HEALTH CARE EDUCATION/TRAINING PROGRAM

## 2025-06-13 PROCEDURE — 83036 HEMOGLOBIN GLYCOSYLATED A1C: CPT | Performed by: STUDENT IN AN ORGANIZED HEALTH CARE EDUCATION/TRAINING PROGRAM

## 2025-06-13 PROCEDURE — 3008F BODY MASS INDEX DOCD: CPT | Performed by: STUDENT IN AN ORGANIZED HEALTH CARE EDUCATION/TRAINING PROGRAM

## 2025-06-13 PROCEDURE — 1036F TOBACCO NON-USER: CPT | Performed by: STUDENT IN AN ORGANIZED HEALTH CARE EDUCATION/TRAINING PROGRAM

## 2025-06-13 PROCEDURE — 81003 URINALYSIS AUTO W/O SCOPE: CPT | Performed by: STUDENT IN AN ORGANIZED HEALTH CARE EDUCATION/TRAINING PROGRAM

## 2025-06-13 PROCEDURE — 4010F ACE/ARB THERAPY RXD/TAKEN: CPT | Performed by: STUDENT IN AN ORGANIZED HEALTH CARE EDUCATION/TRAINING PROGRAM

## 2025-06-13 RX ORDER — MUPIROCIN 20 MG/G
OINTMENT TOPICAL
COMMUNITY
Start: 2025-05-09

## 2025-06-13 RX ORDER — DAPAGLIFLOZIN 10 MG/1
10 TABLET, FILM COATED ORAL DAILY
Qty: 90 TABLET | Refills: 2 | Status: SHIPPED | OUTPATIENT
Start: 2025-06-13

## 2025-06-13 RX ORDER — AMLODIPINE BESYLATE 10 MG/1
10 TABLET ORAL DAILY
Qty: 90 TABLET | Refills: 1 | Status: SHIPPED | OUTPATIENT
Start: 2025-06-13

## 2025-06-13 RX ORDER — OLMESARTAN MEDOXOMIL 20 MG/1
20 TABLET ORAL DAILY
Qty: 90 TABLET | Refills: 1 | Status: SHIPPED | OUTPATIENT
Start: 2025-06-13 | End: 2025-12-10

## 2025-06-13 RX ORDER — ATORVASTATIN CALCIUM 10 MG/1
10 TABLET, FILM COATED ORAL DAILY
Qty: 90 TABLET | Refills: 3 | Status: SHIPPED | OUTPATIENT
Start: 2025-06-13 | End: 2025-06-13 | Stop reason: SDUPTHER

## 2025-06-13 RX ORDER — ATORVASTATIN CALCIUM 10 MG/1
10 TABLET, FILM COATED ORAL DAILY
Qty: 90 TABLET | Refills: 1 | Status: SHIPPED | OUTPATIENT
Start: 2025-06-13 | End: 2026-07-18

## 2025-06-13 ASSESSMENT — PAIN SCALES - GENERAL: PAINLEVEL_OUTOF10: 0-NO PAIN

## 2025-06-13 ASSESSMENT — PATIENT HEALTH QUESTIONNAIRE - PHQ9
SUM OF ALL RESPONSES TO PHQ9 QUESTIONS 1 AND 2: 0
2. FEELING DOWN, DEPRESSED OR HOPELESS: NOT AT ALL
1. LITTLE INTEREST OR PLEASURE IN DOING THINGS: NOT AT ALL

## 2025-06-13 ASSESSMENT — ENCOUNTER SYMPTOMS
PALPITATIONS: 0
FEVER: 0
HEADACHES: 0
SHORTNESS OF BREATH: 0
CHILLS: 0

## 2025-06-13 NOTE — PROGRESS NOTES
GENERAL PROGRESS NOTE    Chief Complaint   Patient presents with    Hypertension       HPI  Chris Moody is a 55 y.o. male that presents for DM and HTN visit.     Will be having L knee replacement in August with [facility].     CHRONIC CONDITIONS:  T2DM   HTN - amlodipine 10 mg. BP today 140/84     #T2DM  Current medication regimen:   Farxiga 10 mg   Tolerating current medication regimen yes  Prior metformin and rybelsus -- discontinued due to side effects  Blood sugars at home averaging: not checking - does have glucometer  CGM? no  Hypoglycemic episodes no  Last A1C : 6.0 POC today, 6.4, 7.1, 8.0   Last eGFR - 99  Last Creatinine - 0.92  Last microalbumin - ordered  Up to date on yearly eye exams : UTD - sees every 4 mos - Dr. Garcia and also seeing retina specialist  Follows with podiatry no  Has been working on improving diet  SECONDARY PREVENTION  - Statin? Agreeable. Atorvastatin ordered  - ACE-I/ARB? No  - Aspirin? No         Vital signs   /84 (BP Location: Left arm, Patient Position: Sitting, BP Cuff Size: Large adult)   Pulse 92   Ht 1.829 m (6')   Wt 120 kg (264 lb)   SpO2 96%   BMI 35.80 kg/m²      Current Medications[1]    Review of Systems   Constitutional:  Negative for chills and fever.   Eyes:  Negative for visual disturbance.   Respiratory:  Negative for shortness of breath.    Cardiovascular:  Negative for chest pain, palpitations and leg swelling.   Neurological:  Negative for headaches.        Physical Exam  Constitutional:       Appearance: Normal appearance.   Cardiovascular:      Rate and Rhythm: Normal rate and regular rhythm.      Heart sounds: Normal heart sounds.   Pulmonary:      Effort: Pulmonary effort is normal.      Breath sounds: Normal breath sounds. No wheezing, rhonchi or rales.   Musculoskeletal:      Right lower leg: No edema.      Left lower leg: No  edema.   Neurological:      Mental Status: He is alert.         Assessment & Plan  Essential hypertension  Elevated on presentation but improved on repeat. Will keep medications the same. Asked that he begin home monitoring 1-2x weekly BP readings.   Labs to be drawn  Reviewed dietary efforts and physical activity      Orders:    POCT UA Automated manually resulted    Albumin-Creatinine Ratio, Urine Random    Type 2 diabetes mellitus without complication, without long-term current use of insulin  Controlled  Continue current medication regimen.   Patient is onACEi/ARB, and statin  Routine diabetic retinopathy screening up to date   Discussed dietary efforts and physical activity    Orders:    Albumin-Creatinine Ratio, Urine Random    POCT glycosylated hemoglobin (Hb A1C) manually resulted    atorvastatin (Lipitor) 10 mg tablet; Take 1 tablet (10 mg) by mouth once daily.    Hyperlipidemia, unspecified hyperlipidemia type  Discussed statin recommended in all diabetic patients for primary prevention of cardiovascular disease and mortality. He is agreeable to starting. Recommend healthy diet  that includes lean proteins, vegetables, fruits, and whole grains.  Limit saturated fats, excess sodium, and processed foods.  Limit sugary drinks and sweets.  Moderate exercise at least 30 minutes per day, 5 days per week.    Orders:    atorvastatin (Lipitor) 10 mg tablet; Take 1 tablet (10 mg) by mouth once daily.          Orders Placed This Encounter   Procedures    Albumin-Creatinine Ratio, Urine Random     Release result to [facility]:   Immediate [1]    POCT UA Automated manually resulted     Release result to [facility]:   Immediate [1]    POCT glycosylated hemoglobin (Hb A1C) manually resulted     Release result to MyChart:   Immediate [1]        Julissa Purdy MD  06/13/25  10:52 AM         [1]   Current Outpatient Medications   Medication Sig Dispense Refill    amLODIPine (Norvasc) 10 mg tablet Take 1 tablet (10 mg) by mouth  once daily. 90 tablet 0    dapagliflozin propanediol (Farxiga) 10 mg Take 1 tablet (10 mg) by mouth once daily. 90 tablet 2    olmesartan (BENIcar) 20 mg tablet Take 1 tablet (20 mg) by mouth once daily. 90 tablet 1    mupirocin (Bactroban) 2 % ointment Apply 0.5 inch with a Q-tip in each nostril in the morning and evening for 5 days before surgery. (Patient not taking: Reported on 6/13/2025)       No current facility-administered medications for this visit.

## 2025-06-13 NOTE — ASSESSMENT & PLAN NOTE
Discussed statin recommended in all diabetic patients for primary prevention of cardiovascular disease and mortality. He is agreeable to starting. Recommend healthy diet  that includes lean proteins, vegetables, fruits, and whole grains.  Limit saturated fats, excess sodium, and processed foods.  Limit sugary drinks and sweets.  Moderate exercise at least 30 minutes per day, 5 days per week.    Orders:    atorvastatin (Lipitor) 10 mg tablet; Take 1 tablet (10 mg) by mouth once daily.

## 2025-06-13 NOTE — ASSESSMENT & PLAN NOTE
Controlled  Continue current medication regimen.   Patient is onACEi/ARB, and statin  Routine diabetic retinopathy screening up to date   Discussed dietary efforts and physical activity    Orders:    Albumin-Creatinine Ratio, Urine Random    POCT glycosylated hemoglobin (Hb A1C) manually resulted    atorvastatin (Lipitor) 10 mg tablet; Take 1 tablet (10 mg) by mouth once daily.

## 2025-06-13 NOTE — ASSESSMENT & PLAN NOTE
Elevated on presentation but improved on repeat. Will keep medications the same. Asked that he begin home monitoring 1-2x weekly BP readings.   Labs to be drawn  Reviewed dietary efforts and physical activity      Orders:    POCT UA Automated manually resulted    Albumin-Creatinine Ratio, Urine Random

## 2025-06-16 LAB
ALBUMIN/CREAT UR: 6 MG/G CREAT
CREAT UR-MCNC: 47 MG/DL (ref 20–320)
MICROALBUMIN UR-MCNC: 0.3 MG/DL

## 2025-06-20 ENCOUNTER — TELEPHONE (OUTPATIENT)
Dept: PRIMARY CARE | Facility: CLINIC | Age: 55
End: 2025-06-20
Payer: COMMERCIAL

## 2025-06-20 DIAGNOSIS — E11.9 TYPE 2 DIABETES MELLITUS WITHOUT COMPLICATION, WITHOUT LONG-TERM CURRENT USE OF INSULIN: ICD-10-CM

## 2025-06-20 DIAGNOSIS — Z12.5 PROSTATE CANCER SCREENING: ICD-10-CM

## 2025-06-20 DIAGNOSIS — I10 ESSENTIAL HYPERTENSION: ICD-10-CM

## 2025-06-20 DIAGNOSIS — E78.5 HYPERLIPIDEMIA, UNSPECIFIED HYPERLIPIDEMIA TYPE: ICD-10-CM

## 2025-06-26 PROCEDURE — RXMED WILLOW AMBULATORY MEDICATION CHARGE

## 2025-06-30 ENCOUNTER — PHARMACY VISIT (OUTPATIENT)
Dept: PHARMACY | Facility: CLINIC | Age: 55
End: 2025-06-30
Payer: COMMERCIAL

## 2025-06-30 DIAGNOSIS — I10 ESSENTIAL HYPERTENSION: ICD-10-CM

## 2025-07-03 RX ORDER — OLMESARTAN MEDOXOMIL 20 MG/1
20 TABLET ORAL DAILY
Qty: 90 TABLET | Refills: 1 | Status: SHIPPED | OUTPATIENT
Start: 2025-07-03

## 2025-08-04 ENCOUNTER — TELEPHONE (OUTPATIENT)
Dept: PRIMARY CARE | Facility: CLINIC | Age: 55
End: 2025-08-04
Payer: COMMERCIAL

## 2025-08-05 ENCOUNTER — PHARMACY VISIT (OUTPATIENT)
Dept: PHARMACY | Facility: CLINIC | Age: 55
End: 2025-08-05
Payer: COMMERCIAL

## 2025-08-05 DIAGNOSIS — E11.9 TYPE 2 DIABETES MELLITUS WITHOUT COMPLICATION, WITHOUT LONG-TERM CURRENT USE OF INSULIN: ICD-10-CM

## 2025-08-05 PROCEDURE — RXMED WILLOW AMBULATORY MEDICATION CHARGE

## 2025-08-05 RX ORDER — DAPAGLIFLOZIN 10 MG/1
10 TABLET, FILM COATED ORAL DAILY
Qty: 90 TABLET | Refills: 3 | Status: SHIPPED | OUTPATIENT
Start: 2025-08-05

## 2025-08-06 PROCEDURE — RXMED WILLOW AMBULATORY MEDICATION CHARGE

## 2025-08-07 ENCOUNTER — PHARMACY VISIT (OUTPATIENT)
Dept: PHARMACY | Facility: CLINIC | Age: 55
End: 2025-08-07
Payer: COMMERCIAL

## 2025-08-26 ENCOUNTER — PHARMACY VISIT (OUTPATIENT)
Dept: PHARMACY | Facility: CLINIC | Age: 55
End: 2025-08-26
Payer: COMMERCIAL

## 2025-08-26 PROCEDURE — RXMED WILLOW AMBULATORY MEDICATION CHARGE

## 2025-08-26 PROCEDURE — RXOTC WILLOW AMBULATORY OTC CHARGE

## 2025-08-26 RX ORDER — METHOCARBAMOL 750 MG/1
750 TABLET, FILM COATED ORAL EVERY 8 HOURS PRN
Qty: 21 TABLET | Refills: 0 | OUTPATIENT
Start: 2025-08-18

## 2025-08-27 ENCOUNTER — TELEPHONE (OUTPATIENT)
Dept: PRIMARY CARE | Facility: CLINIC | Age: 55
End: 2025-08-27
Payer: COMMERCIAL

## 2025-08-28 PROCEDURE — RXMED WILLOW AMBULATORY MEDICATION CHARGE

## 2025-08-29 ENCOUNTER — PHARMACY VISIT (OUTPATIENT)
Dept: PHARMACY | Facility: CLINIC | Age: 55
End: 2025-08-29
Payer: COMMERCIAL

## 2025-08-29 RX ORDER — METHOCARBAMOL 750 MG/1
750 TABLET, FILM COATED ORAL EVERY 8 HOURS PRN
Qty: 21 TABLET | Refills: 0 | OUTPATIENT
Start: 2025-08-29

## 2025-10-13 ENCOUNTER — APPOINTMENT (OUTPATIENT)
Dept: PRIMARY CARE | Facility: CLINIC | Age: 55
End: 2025-10-13
Payer: COMMERCIAL

## 2025-12-12 ENCOUNTER — APPOINTMENT (OUTPATIENT)
Dept: PRIMARY CARE | Facility: CLINIC | Age: 55
End: 2025-12-12
Payer: COMMERCIAL